# Patient Record
Sex: MALE | Race: BLACK OR AFRICAN AMERICAN
[De-identification: names, ages, dates, MRNs, and addresses within clinical notes are randomized per-mention and may not be internally consistent; named-entity substitution may affect disease eponyms.]

---

## 2017-01-06 ENCOUNTER — HOSPITAL ENCOUNTER (EMERGENCY)
Dept: HOSPITAL 25 - ED | Age: 37
Discharge: HOME | End: 2017-01-06
Payer: SELF-PAY

## 2017-01-06 VITALS — DIASTOLIC BLOOD PRESSURE: 72 MMHG | SYSTOLIC BLOOD PRESSURE: 120 MMHG

## 2017-01-06 DIAGNOSIS — R07.9: Primary | ICD-10-CM

## 2017-01-06 LAB
ADD DIFF/SLIDE REVIEW?: (no result)
ALBUMIN SERPL BCG-MCNC: 4.7 G/DL (ref 3.2–5.2)
ALP SERPL-CCNC: 80 U/L (ref 34–104)
ALT SERPL W P-5'-P-CCNC: 42 U/L (ref 7–52)
ANION GAP SERPL CALC-SCNC: (no result) MMOL/L (ref 2–11)
AST SERPL-CCNC: (no result) U/L (ref 13–39)
BUN SERPL-MCNC: 14 MG/DL (ref 6–24)
BUN/CREAT SERPL: 12.8 (ref 8–20)
CALCIUM SERPL-MCNC: 9.3 MG/DL (ref 8.6–10.3)
CHLORIDE SERPL-SCNC: 101 MMOL/L (ref 101–111)
GLOBULIN SER CALC-MCNC: 3.5 G/DL (ref 2–4)
GLUCOSE SERPL-MCNC: 118 MG/DL (ref 70–100)
HCO3 SERPL-SCNC: 25 MMOL/L (ref 22–32)
HCT VFR BLD AUTO: 43 % (ref 42–52)
HGB BLD-MCNC: 14.2 G/DL (ref 14–18)
MCH RBC QN AUTO: 30 PG (ref 27–31)
MCHC RBC AUTO-ENTMCNC: 33 G/DL (ref 31–36)
MCV RBC AUTO: 90 FL (ref 80–94)
POTASSIUM SERPL-SCNC: (no result) MMOL/L (ref 3.5–5)
PROT SERPL-MCNC: 8.2 G/DL (ref 6.4–8.9)
RBC # BLD AUTO: 4.72 10^6/UL (ref 4–5.4)
SODIUM SERPL-SCNC: 133 MMOL/L (ref 133–145)
TROPONIN I SERPL-MCNC: 0 NG/ML (ref ?–0.04)
WBC # BLD AUTO: 11.4 10^3/UL (ref 3.5–10.8)

## 2017-01-06 PROCEDURE — 84484 ASSAY OF TROPONIN QUANT: CPT

## 2017-01-06 PROCEDURE — 99283 EMERGENCY DEPT VISIT LOW MDM: CPT

## 2017-01-06 PROCEDURE — 80053 COMPREHEN METABOLIC PANEL: CPT

## 2017-01-06 PROCEDURE — 36415 COLL VENOUS BLD VENIPUNCTURE: CPT

## 2017-01-06 PROCEDURE — 85025 COMPLETE CBC W/AUTO DIFF WBC: CPT

## 2017-01-06 PROCEDURE — 71020: CPT

## 2017-01-06 PROCEDURE — 93005 ELECTROCARDIOGRAM TRACING: CPT

## 2017-01-06 NOTE — ED
Roel WALTER Claudia, scribed for Gino Sesay MD on 01/06/17 at 0408 .





HPI Chest Pain





- HPI Summary


HPI Summary: 





36 year old male presents to the ED with CP. Pt states he has been having 

episodic bouts of CP for the past 4 months pt notes the last episode before 

tonight was earlier this week. He describes the left anterior CP as a tightness 

that radiates to his left arm. Pt states that he "did not feel like himself" 

this evening, thereby bringing him to the ED. He notes that he has seen s 

physician for these Sx once in the past. 


SHx of Heavy Daily Tobacco use,  Daily alcohol consumption and Marijuana usage 

is noted. 





- History of Current Complaint


Hx Obtained From: Patient


Onset/Duration: Started Hours Ago, Still Present


Timing: Intermittent


Pain Scale Used: 0-10 Numeric


Chest Pain Location: Left Anterior


Chest Pain Radiates: Yes


Chest Pain Radiates To:: Arm


Character: Tightness


Aggravating Factor(s): Nothing


Alleviating Factor(s): Nothing


Associated Signs and Symptoms: Positive: Chest Pain.  Negative: Fever, Chills





- Allergy/Home Medications


Allergies/Adverse Reactions: 


 Allergies











Allergy/AdvReac Type Severity Reaction Status Date / Time


 


No Known Allergies Allergy   Verified 06/08/15 18:56














PMH/Surg Hx/FS Hx/Imm Hx


Previously Healthy: Yes


Endocrine/Hematology History: 


   Denies: Hx Diabetes


Cardiovascular History: 


   Denies: Hx Hypertension


Psychiatric History: Reports: Hx Schizophrenia





- Family History


Known Family History: Positive: Hypertension





- Social History


Occupation: Unemployed


Lives: Alone


Alcohol Use: Daily


Substance Use Type: Reports: Marijuana


Smoking Status (MU): Heavy Every Day Tobacco Smoker





Review of Systems


Negative: Fever, Chills


Eyes: Negative


ENT: Negative


Positive: Chest Pain


Respiratory: Negative


Gastrointestinal: Negative


Genitourinary: Negative


Musculoskeletal: Negative


Skin: Negative


Neurological: Negative


Psychological: Normal


All Other Systems Reviewed And Are Negative: Yes





Physical Exam


Triage Information Reviewed: Yes


Vital Signs On Initial Exam: 


 Initial Vitals











BP


 


 145/85 


 


 01/06/17 04:12











Vital Signs Reviewed: Yes


Appearance: Positive: No Pain Distress, Thin


Skin: Positive: Warm


Eyes: Positive: BLAKE


ENT: Positive: Hearing grossly normal


Neck: Positive: Supple


Respiratory/Lung Sounds: Positive: Clear to Auscultation, Breath Sounds Present


Cardiovascular: Positive: Normal, RRR


Abdomen Description: Positive: Nontender, No Organomegaly, Soft


Bowel Sounds: Positive: Present


Musculoskeletal: Positive: Strength/ROM Intact


Neurological: Positive: Sensory/Motor Intact, Alert, Oriented to Person Place, 

Time, Normal Gait


Psychiatric: Positive: Normal





Diagnostics





- Vital Signs


 Vital Signs











  Temp Pulse Resp BP Pulse Ox


 


 01/06/17 05:00   92  18   99


 


 01/06/17 04:17  98.3 F  112  18  145/85  98


 


 01/06/17 04:14   129  23   100


 


 01/06/17 04:12     145/85 














- Laboratory


Lab Results: 


 Lab Results











  01/06/17 01/06/17 Range/Units





  04:30 04:30 


 


WBC  11.4 H   (3.5-10.8)  10^3/ul


 


RBC  4.72   (4.0-5.4)  10^6/ul


 


Hgb  14.2   (14.0-18.0)  g/dl


 


Hct  43   (42-52)  %


 


MCV  90   (80-94)  fL


 


MCH  30   (27-31)  pg


 


MCHC  33   (31-36)  g/dl


 


RDW  13   (10.5-15)  %


 


Plt Count  187   (150-450)  10^3/ul


 


MPV  10   (7.4-10.4)  um3


 


Neut % (Auto)  78.7   (38-83)  %


 


Lymph % (Auto)  17.3 L   (25-47)  %


 


Mono % (Auto)  2.5   (1-9)  %


 


Eos % (Auto)  0.1   (0-6)  %


 


Baso % (Auto)  1.4   (0-2)  %


 


Absolute Neuts (auto)  9.0 H   (1.5-7.7)  10^3/ul


 


Absolute Lymphs (auto)  2.0   (1.0-4.8)  10^3/ul


 


Absolute Monos (auto)  0.3   (0-0.8)  10^3/ul


 


Absolute Eos (auto)  0   (0-0.6)  10^3/ul


 


Absolute Basos (auto)  0.2   (0-0.2)  10^3/ul


 


Absolute Nucleated RBC  0   10^3/ul


 


Nucleated RBC %  0   


 


Sodium   133  (133-145)  mmol/L


 


Potassium   TNP  


 


Chloride   101  (101-111)  mmol/L


 


Carbon Dioxide   25  (22-32)  mmol/L


 


Anion Gap   TNP  


 


BUN   14  (6-24)  mg/dL


 


Creatinine   1.09  (0.67-1.17)  mg/dL


 


Est GFR ( Amer)   99.6  (>60)  


 


Est GFR (Non-Af Amer)   77.4  (>60)  


 


BUN/Creatinine Ratio   12.8  (8-20)  


 


Glucose   118 H  ()  mg/dL


 


Calcium   9.3  (8.6-10.3)  mg/dL


 


Total Bilirubin   0.40  (0.2-1.0)  mg/dL


 


AST   TNP  


 


ALT   42  (7-52)  U/L


 


Alkaline Phosphatase   80  ()  U/L


 


Total Protein   8.2  (6.4-8.9)  g/dL


 


Albumin   4.7  (3.2-5.2)  g/dL


 


Globulin   3.5  (2-4)  g/dL


 


Albumin/Globulin Ratio   1.3  (1-3)  











Pertinent Lab Values Are: WNL


Result Diagrams: 


 01/06/17 04:30





 01/06/17 05:36


Lab Statement: Any lab studies that have been ordered have been reviewed, and 

results considered in the medical decision making process.





- EKG


  ** 4:09


Cardiac Rate: NL


EKG Rhythm: Sinus Tachycardia - 117 beats/min





Re-Evaluation





- Re-Evaluation


  ** First Eval


Re-Evaluation Time: 06:03 - results d/w pt


Change: Improved





Chest Pain Course/Dx





- Diagnoses


Provider Diagnoses: 


 Chest pain








Discharge





- Discharge Plan


Condition: Improved


Disposition: HOME


Patient Education Materials:  Chest Pain (ED)





The documentation as recorded by the Roel ge Claudia accurately 

reflects the service I personally performed and the decisions made by me, 

Gion Sesay MD.

## 2017-01-06 NOTE — RAD
INDICATION:  Chest pain.



COMPARISON:  Comparison is made with a prior study from June 8, 2015.



TECHNIQUE: Dual-energy PA  and lateral views of the chest were obtained.



FINDINGS:   The heart is within normal limits in size. Mediastinal and hilar contours

appear within normal limits.



The lungs are clear. No pleural effusion is present. 



IMPRESSION:  NO EVIDENCE FOR ACTIVE CARDIOPULMONARY DISEASE.

## 2018-11-26 ENCOUNTER — HOSPITAL ENCOUNTER (EMERGENCY)
Dept: HOSPITAL 25 - UCEAST | Age: 38
Discharge: HOME | End: 2018-11-26
Payer: COMMERCIAL

## 2018-11-26 VITALS — SYSTOLIC BLOOD PRESSURE: 116 MMHG | DIASTOLIC BLOOD PRESSURE: 78 MMHG

## 2018-11-26 DIAGNOSIS — M54.9: Primary | ICD-10-CM

## 2018-11-26 DIAGNOSIS — F17.210: ICD-10-CM

## 2018-11-26 PROCEDURE — G0463 HOSPITAL OUTPT CLINIC VISIT: HCPCS

## 2018-11-26 PROCEDURE — 99212 OFFICE O/P EST SF 10 MIN: CPT

## 2018-11-26 NOTE — UC
Back Pain HPI





- HPI Summary


HPI Summary: 


38 year old male presents with 4 day history of mid back pain. Describes as 

constant ache. Worsens with movement. Has tried OTC ibuprofen and heating pad 

without relief. Denies injury, fever, chills, chest pain, palpitations, 

shortness of breath, abdominal pain, nausea, vomiting, flank pain, dysuria, 

frequency, urgency, hematuria, extremity weakness, numbness, or tingling, or 

loss of bowel or bladder control.








- History of Current Complaint


Chief Complaint: UCBackPain


Stated Complaint: BACK PAIN


Time Seen by Provider: 11/26/18 13:23


Hx Obtained From: Patient


Onset/Duration: Gradual Onset, Lasting Days


Timing: Constant


Severity Currently: Moderate


Pain Intensity: 8


Character: Aching


Aggravating Factor(s): Movement


Alleviating Factor(s): Nothing


Associated Signs And Symptoms: Negative: Fever, Weakness, Numbness, Tingling, 

Abdominal Pain, Flank Pain, Bladder Incontinence, Bowel Incontinence





- Allergies/Home Medications


Allergies/Adverse Reactions: 


 Allergies











Allergy/AdvReac Type Severity Reaction Status Date / Time


 


No Known Allergies Allergy   Verified 11/26/18 13:15














PMH/Surg Hx/FS Hx/Imm Hx


Previously Healthy: Yes - Denies significant PMH





- Surgical History


Surgical History: None


Surgery Procedure, Year, and Place: denies





- Family History


Known Family History: Positive: Hypertension





- Social History


Occupation: Employed Full-time


Lives: With Family


Alcohol Use: Weekly


Substance Use Type: None


Smoking Status (MU): Heavy Every Day Tobacco Smoker





Review of Systems


All Other Systems Reviewed And Are Negative: Yes


Constitutional: Negative: Fever, Chills


Skin: Negative: Rash


Respiratory: Negative: Shortness Of Breath, Cough


Cardiovascular: Negative: Palpitations, Chest Pain


Gastrointestinal: Negative: Abdominal Pain, Vomiting, Diarrhea, Nausea


Genitourinary: Negative: Dysuria, Hematuria, Frequency, Urgency


Motor: Negative: Weakness


Neurovascular: Negative: Decreased Sensation


Musculoskeletal: Positive: Other: - See HPI


Neurological: Negative: Headache, Weakness, Paresthesia, Numbness


Is Patient Immunocompromised?: No





Physical Exam





- Summary


Physical Exam Summary: 


GENERAL APPEARANCE: Well developed, well nourished, alert and cooperative, and 

appears to be in no acute distress.





NECK: Neck supple, non-tender without lymphadenopathy.





CARDIAC: Normal S1 and S2. No S3, S4 or murmurs. Rhythm is regular. There is no 

peripheral edema, cyanosis or pallor. Extremities are warm and well perfused. 

Capillary refill is less than 2 seconds.





LUNGS: Clear to auscultation and percussion without rales, rhonchi, wheezing or 

diminished breath sounds.





ABDOMEN: Positive bowel sounds. Soft, nondistended, nontender. No guarding or 

rebound. No masses or hepatosplenomegally.





MUSKULOSKELETAL: ROM intact to all extremities. No joint erythema or 

tenderness. Normal muscular development. Normal gait.





BACK: Examination of the spine reveals normal gait and posture. No spinal 

deformity or tenderness. Bilateral thoracic soft tissue tenderness with spasm 

noted.





EXTREMITIES: No significant deformity or joint abnormality. No edema. 

Peripheral pulses intact.





NEUROLOGICAL: Strength and sensation symmetric and intact throughout. Reflexes 2

+ throughout.





SKIN: Skin normal color, texture and turgor with no lesions or eruptions.





Vital Signs: 


 Initial Vital Signs











Temp  98.5 F   11/26/18 13:11


 


Pulse  84   11/26/18 13:11


 


Resp  18   11/26/18 13:11


 


BP  116/78   11/26/18 13:11


 


Pulse Ox  100   11/26/18 13:11














Back Pain Course/Dx





- Course


Course Of Treatment: 38 year old male presents with 4 day history of mid back 

pain. Describes as constant ache. Worsens with movement. Has tried OTC 

ibuprofen and heating pad without relief. Denies injury, fever, chills, chest 

pain, palpitations, shortness of breath, abdominal pain, nausea, vomiting, 

flank pain, dysuria, frequency, urgency, hematuria, extremity weakness, numbness

, or tingling, or loss of bowel or bladder control. Exam unremarkable except 

for some soft tissue tenderness and spasm of the thoracic back consistent with 

muscluskeletal back pain. Recommend conservative treatment with NSAIDs, Muscle 

relaxant, heat, and back exercises. He is to follow up with PCP in 7 days if 

symptoms persist. Warning symptoms reviewed. Verbalizes understanding and 

agrees with POC.





- Differential Dx/Diagnosis


Provider Diagnosis: 


 Acute back pain








Discharge





- Sign-Out/Discharge


Documenting (check all that apply): Patient Departure


All imaging exams completed and their final reports reviewed: No Studies





- Discharge Plan


Condition: Stable


Disposition: HOME


Prescriptions: 


Cyclobenzaprine HCl 10 mg PO Q8HR PRN #15 tablet


 PRN Reason: Severe Pain


Naproxen [Naproxen 500 mg tab] 500 mg PO Q12HR #30 tablet


Patient Education Materials:  Back Pain (ED), Lower Back Exercises (ED)


Referrals: 


No Primary Care Phys,NOPCP [Primary Care Provider] - 


[facility] [place] PHYSICIAN REFERRAL [Outside]


Additional Instructions: 


Your history and exam are consistent with a back pain of musculoskeletal origin.





You should stay as active as possible to avoid allowing the back muscles to 

become weak which will worsen your condition. Avoid heavy lifting (more than 10 

lbs) while having pain or any activities that cause pain.





Take naproxen 1 tab every 12 hours with food for next 7 days. After 7 days you 

may take every 12 hours as needed for pain.





Take cyclobenzaprine (Flexeril) 1 tab every 8 hours as needed for severe pain 

or spasm. This will cause drowsiness so do not take and drive or operate 

machinery. Avoid alcohol while taking.





Apply warm moist heat to the affected area for 15-20 minutes 3-4 times a day.





Use the back exercises provided to you. Start slow and increase exercise as 

tolerated.





Follow up with your primary care provider in 7 days if symptoms persist. I have 

provided you with the number to the [facility] Physician Referral 

service if you need assistance with scheduling with a primary care provider.





Seek immediate medical attention in the emergency room if you develop fever 

greater than 100.5 F, have worsening pain despite pain medication, develop 

numbness, tingling, or weakness in your extremities, lose control of your bowel 

or bladder, or have any worsening of symptoms.





- Billing Disposition and Condition


Condition: STABLE


Disposition: Home